# Patient Record
Sex: FEMALE | URBAN - METROPOLITAN AREA
[De-identification: names, ages, dates, MRNs, and addresses within clinical notes are randomized per-mention and may not be internally consistent; named-entity substitution may affect disease eponyms.]

---

## 2021-10-25 ENCOUNTER — INPATIENT (INPATIENT)
Facility: HOSPITAL | Age: 31
LOS: 2 days | Discharge: HOME | End: 2021-10-28
Attending: INTERNAL MEDICINE | Admitting: INTERNAL MEDICINE
Payer: MEDICAID

## 2021-10-25 VITALS
SYSTOLIC BLOOD PRESSURE: 124 MMHG | HEART RATE: 131 BPM | DIASTOLIC BLOOD PRESSURE: 83 MMHG | HEIGHT: 62 IN | TEMPERATURE: 99 F | WEIGHT: 164.91 LBS | RESPIRATION RATE: 19 BRPM | OXYGEN SATURATION: 96 %

## 2021-10-25 LAB
ALBUMIN SERPL ELPH-MCNC: 3.4 G/DL — SIGNIFICANT CHANGE UP (ref 3.3–5)
ALP SERPL-CCNC: 69 U/L — SIGNIFICANT CHANGE UP (ref 40–120)
ALT FLD-CCNC: 22 U/L — SIGNIFICANT CHANGE UP (ref 12–78)
AMPHET UR-MCNC: NEGATIVE — SIGNIFICANT CHANGE UP
AMYLASE P1 CFR SERPL: 60 U/L — SIGNIFICANT CHANGE UP (ref 25–115)
ANION GAP SERPL CALC-SCNC: 6 MMOL/L — SIGNIFICANT CHANGE UP (ref 5–17)
APAP SERPL-MCNC: < 2 UG/ML (ref 10–30)
APPEARANCE UR: ABNORMAL
APTT BLD: 30 SEC — SIGNIFICANT CHANGE UP (ref 27.5–35.5)
AST SERPL-CCNC: 27 U/L — SIGNIFICANT CHANGE UP (ref 15–37)
BACTERIA # UR AUTO: ABNORMAL
BARBITURATES UR SCN-MCNC: NEGATIVE — SIGNIFICANT CHANGE UP
BASOPHILS # BLD AUTO: 0.03 K/UL — SIGNIFICANT CHANGE UP (ref 0–0.2)
BASOPHILS NFR BLD AUTO: 0.2 % — SIGNIFICANT CHANGE UP (ref 0–2)
BENZODIAZ UR-MCNC: NEGATIVE — SIGNIFICANT CHANGE UP
BILIRUB DIRECT SERPL-MCNC: 0.07 MG/DL — SIGNIFICANT CHANGE UP (ref 0.05–0.2)
BILIRUB INDIRECT FLD-MCNC: 0.1 MG/DL — LOW (ref 0.2–1)
BILIRUB SERPL-MCNC: 0.2 MG/DL — SIGNIFICANT CHANGE UP (ref 0.2–1.2)
BILIRUB UR-MCNC: NEGATIVE — SIGNIFICANT CHANGE UP
BUN SERPL-MCNC: 5 MG/DL — LOW (ref 7–23)
CALCIUM SERPL-MCNC: 9.1 MG/DL — SIGNIFICANT CHANGE UP (ref 8.5–10.1)
CHLORIDE SERPL-SCNC: 109 MMOL/L — HIGH (ref 96–108)
CO2 SERPL-SCNC: 26 MMOL/L — SIGNIFICANT CHANGE UP (ref 22–31)
COCAINE METAB.OTHER UR-MCNC: NEGATIVE — SIGNIFICANT CHANGE UP
COLOR SPEC: ABNORMAL
CREAT SERPL-MCNC: 0.67 MG/DL — SIGNIFICANT CHANGE UP (ref 0.5–1.3)
D DIMER BLD IA.RAPID-MCNC: <150 NG/ML DDU — SIGNIFICANT CHANGE UP
DIFF PNL FLD: ABNORMAL
EOSINOPHIL # BLD AUTO: 0.29 K/UL — SIGNIFICANT CHANGE UP (ref 0–0.5)
EOSINOPHIL NFR BLD AUTO: 2.3 % — SIGNIFICANT CHANGE UP (ref 0–6)
EPI CELLS # UR: SIGNIFICANT CHANGE UP
ETHANOL SERPL-MCNC: <10 MG/DL — SIGNIFICANT CHANGE UP (ref 0–10)
GLUCOSE SERPL-MCNC: 92 MG/DL — SIGNIFICANT CHANGE UP (ref 70–99)
GLUCOSE UR QL: NEGATIVE MG/DL — SIGNIFICANT CHANGE UP
HCG UR QL: NEGATIVE — SIGNIFICANT CHANGE UP
HCT VFR BLD CALC: 32.5 % — LOW (ref 34.5–45)
HGB BLD-MCNC: 10.8 G/DL — LOW (ref 11.5–15.5)
IMM GRANULOCYTES NFR BLD AUTO: 0.4 % — SIGNIFICANT CHANGE UP (ref 0–1.5)
INR BLD: 1.01 RATIO — SIGNIFICANT CHANGE UP (ref 0.88–1.16)
KETONES UR-MCNC: NEGATIVE — SIGNIFICANT CHANGE UP
LEUKOCYTE ESTERASE UR-ACNC: ABNORMAL
LIDOCAIN IGE QN: 27 U/L — LOW (ref 73–393)
LYMPHOCYTES # BLD AUTO: 16.1 % — SIGNIFICANT CHANGE UP (ref 13–44)
LYMPHOCYTES # BLD AUTO: 2 K/UL — SIGNIFICANT CHANGE UP (ref 1–3.3)
MCHC RBC-ENTMCNC: 30.3 PG — SIGNIFICANT CHANGE UP (ref 27–34)
MCHC RBC-ENTMCNC: 33.2 GM/DL — SIGNIFICANT CHANGE UP (ref 32–36)
MCV RBC AUTO: 91.3 FL — SIGNIFICANT CHANGE UP (ref 80–100)
METHADONE UR-MCNC: NEGATIVE — SIGNIFICANT CHANGE UP
MONOCYTES # BLD AUTO: 0.59 K/UL — SIGNIFICANT CHANGE UP (ref 0–0.9)
MONOCYTES NFR BLD AUTO: 4.7 % — SIGNIFICANT CHANGE UP (ref 2–14)
NEUTROPHILS # BLD AUTO: 9.5 K/UL — HIGH (ref 1.8–7.4)
NEUTROPHILS NFR BLD AUTO: 76.3 % — SIGNIFICANT CHANGE UP (ref 43–77)
NITRITE UR-MCNC: NEGATIVE — SIGNIFICANT CHANGE UP
NRBC # BLD: 0 /100 WBCS — SIGNIFICANT CHANGE UP (ref 0–0)
OPIATES UR-MCNC: NEGATIVE — SIGNIFICANT CHANGE UP
PCP SPEC-MCNC: SIGNIFICANT CHANGE UP
PCP UR-MCNC: NEGATIVE — SIGNIFICANT CHANGE UP
PH UR: 6.5 — SIGNIFICANT CHANGE UP (ref 5–8)
PLATELET # BLD AUTO: 251 K/UL — SIGNIFICANT CHANGE UP (ref 150–400)
POTASSIUM SERPL-MCNC: 4.3 MMOL/L — SIGNIFICANT CHANGE UP (ref 3.5–5.3)
POTASSIUM SERPL-SCNC: 4.3 MMOL/L — SIGNIFICANT CHANGE UP (ref 3.5–5.3)
PROT SERPL-MCNC: 7.6 GM/DL — SIGNIFICANT CHANGE UP (ref 6–8.3)
PROT UR-MCNC: 100 MG/DL
PROTHROM AB SERPL-ACNC: 11.7 SEC — SIGNIFICANT CHANGE UP (ref 10.6–13.6)
RBC # BLD: 3.56 M/UL — LOW (ref 3.8–5.2)
RBC # FLD: 13.4 % — SIGNIFICANT CHANGE UP (ref 10.3–14.5)
RBC CASTS # UR COMP ASSIST: ABNORMAL /HPF (ref 0–4)
SALICYLATES SERPL-MCNC: <1.7 MG/DL — LOW (ref 2.8–20)
SODIUM SERPL-SCNC: 141 MMOL/L — SIGNIFICANT CHANGE UP (ref 135–145)
SP GR SPEC: 1 — LOW (ref 1.01–1.02)
THC UR QL: NEGATIVE — SIGNIFICANT CHANGE UP
UROBILINOGEN FLD QL: NEGATIVE MG/DL — SIGNIFICANT CHANGE UP
WBC # BLD: 12.46 K/UL — HIGH (ref 3.8–10.5)
WBC # FLD AUTO: 12.46 K/UL — HIGH (ref 3.8–10.5)
WBC UR QL: SIGNIFICANT CHANGE UP

## 2021-10-25 PROCEDURE — 70450 CT HEAD/BRAIN W/O DYE: CPT | Mod: 26,MA

## 2021-10-25 PROCEDURE — 71045 X-RAY EXAM CHEST 1 VIEW: CPT | Mod: 26

## 2021-10-25 PROCEDURE — 99285 EMERGENCY DEPT VISIT HI MDM: CPT

## 2021-10-25 RX ORDER — FAMOTIDINE 10 MG/ML
20 INJECTION INTRAVENOUS ONCE
Refills: 0 | Status: COMPLETED | OUTPATIENT
Start: 2021-10-25 | End: 2021-10-25

## 2021-10-25 RX ORDER — DIPHENHYDRAMINE HCL 50 MG
25 CAPSULE ORAL ONCE
Refills: 0 | Status: COMPLETED | OUTPATIENT
Start: 2021-10-25 | End: 2021-10-25

## 2021-10-25 RX ORDER — SODIUM CHLORIDE 9 MG/ML
1000 INJECTION INTRAMUSCULAR; INTRAVENOUS; SUBCUTANEOUS ONCE
Refills: 0 | Status: COMPLETED | OUTPATIENT
Start: 2021-10-25 | End: 2021-10-25

## 2021-10-25 RX ADMIN — FAMOTIDINE 20 MILLIGRAM(S): 10 INJECTION INTRAVENOUS at 22:55

## 2021-10-25 RX ADMIN — SODIUM CHLORIDE 1000 MILLILITER(S): 9 INJECTION INTRAMUSCULAR; INTRAVENOUS; SUBCUTANEOUS at 19:46

## 2021-10-25 RX ADMIN — Medication 25 MILLIGRAM(S): at 22:55

## 2021-10-25 RX ADMIN — Medication 125 MILLIGRAM(S): at 22:55

## 2021-10-25 RX ADMIN — SODIUM CHLORIDE 1000 MILLILITER(S): 9 INJECTION INTRAMUSCULAR; INTRAVENOUS; SUBCUTANEOUS at 22:46

## 2021-10-25 NOTE — ED PROVIDER NOTE - PHYSICAL EXAMINATION
General: Awake, alert and oriented. No acute distress. Well developed, hydrated and nourished. Appears stated age.   Skin: Skin in warm, dry and intact without rashes or lesions. Appropriate color for ethnicity  HENMT: head normocephalic and atraumatic; bilateral external ears without swelling. no nasal discharge. moist oral mucosa. supple neck, trachea midline  EYES: Conjunctiva clear. nonicteric sclera. EOM intact, Eyelids are normal in appearance without swelling or lesions. pupils mildly constricted but reactive  Cardiac: well perfused, s1,s2, rrr  Respiratory: breathing comfortably on room air. no audible wheezing or stridor  Abdominal: nondistended  MSK: Neck and back are without deformity, visible external skin changes, or signs of trauma. Curvature of the cervical, thoracic, and lumbar spine are within normal limits. no external signs of trauma. no apparent deficits in ROM of any extremity  Neurological: The patient is awake, alert and oriented to person, place, and time with normal speech. CN 2-12 grossly intact. no apparent deficits. Memory is normal and thought process is intact. No gait abnormalities are appreciated.   Psychiatric: Appropriate mood and affect. Good judgement and insight. No visual or auditory hallucinations.

## 2021-10-25 NOTE — ED PROVIDER NOTE - CLINICAL SUMMARY MEDICAL DECISION MAKING FREE TEXT BOX
Patient now feels well, awake and alert. Mildly tachycardiac. However, feels anxiety and sadness due to her relapse. Patient is requesting U-tox so she can get back into her outpatient program. Will test U-tox, observe in ED and likely DC.

## 2021-10-25 NOTE — ED ADULT NURSE NOTE - HOW OFTEN DO YOU HAVE A DRINK CONTAINING ALCOHOL?
How Severe Is Your Acne?: mild Is This A New Presentation, Or A Follow-Up?: Acne Females Only: When Was Your Last Menstrual Period?: 01/19/20 Monthly or less Never

## 2021-10-25 NOTE — ED ADULT TRIAGE NOTE - CHIEF COMPLAINT QUOTE
BIBA- Found on street with shallow respirations. Bystander called 911.   Admits to Heroin use. Also took Gabapentin and Seroquel  HX substance abuse clean 9months, On Ambien, Seroquel and Gabapentin

## 2021-10-25 NOTE — ED PROVIDER NOTE - NSCAREINITIATED _GEN_ER
Spoke with [facility]ist for [facility] 2.5 mg and 7.5 mg tabs authorized for 90 day term.  Patient currently takes 2.5 mg on Monday and 3.75 mg (1/2 tab of 7.5 mg) 6 days per week.   
Jessica Wayne(Attending)

## 2021-10-25 NOTE — ED PROVIDER NOTE - OBJECTIVE STATEMENT
30 y/o F with a PMHx of Substance Abuse was BIBA to the ED for evaluation. Today, after receiving upsetting news Patient relapsed and used intranasal heroin and passed out. Patient is managed on Ambien and Seroquel as prescribed by her psychiatrist. Denies SI, HI, recent illness, co-ingestion or using more than her prescribed dose of home medications. Currently in the ED, Patient now feels fine. Unknown if Patient received Narcan by EMS or not. 32 y/o F with a PMHx of seizure disorder on neurontin, Substance Abuse was BIBA to the ED for evaluation. Today, after receiving upsetting news Patient relapsed and used what she thinks was intranasal heroin and passed out. Patient is managed on Ambien and Seroquel, ehich she started over the past week, as prescribed by her physician. Denies SI, HI, recent illness, co-ingestion or using more than her prescribed dose of home medications. Currently in the ED, Patient now feels fine. EMS flow sheet does not mention patient receiving narcan

## 2021-10-25 NOTE — ED PROVIDER NOTE - CARE PLAN
Principal Discharge DX:	Intractable back pain   1 Principal Discharge DX:	Rash of unknown etiology  Secondary Diagnosis:	Intractable back pain

## 2021-10-25 NOTE — ED ADULT NURSE NOTE - OBJECTIVE STATEMENT
pt presented to ER, HAIR, with complaints of overdose. as per patient, she is unaware why she was brought in to ER. pt states that she was told she was found unresponsive. pt alert, oriented and ambulatory. pt denies any alcohol use, states she has a hx of heroin use and had "one lapse in 9 months". pt upset, irritable and crying on assessment. pt explains that she is worried about her placement in the shelter and does not want to go back to a detox facility.

## 2021-10-26 LAB
CRP SERPL-MCNC: 32 MG/L — HIGH
ERYTHROCYTE [SEDIMENTATION RATE] IN BLOOD: 28 MM/HR — HIGH (ref 0–15)
FLUAV AG NPH QL: SIGNIFICANT CHANGE UP
FLUBV AG NPH QL: SIGNIFICANT CHANGE UP
HCT VFR BLD CALC: 33.6 % — LOW (ref 34.5–45)
HGB BLD-MCNC: 10.8 G/DL — LOW (ref 11.5–15.5)
LACTATE SERPL-SCNC: 1.6 MMOL/L — SIGNIFICANT CHANGE UP (ref 0.7–2)
LACTATE SERPL-SCNC: 3.4 MMOL/L — HIGH (ref 0.7–2)
LACTATE SERPL-SCNC: 4 MMOL/L — CRITICAL HIGH (ref 0.7–2)
MCHC RBC-ENTMCNC: 29.8 PG — SIGNIFICANT CHANGE UP (ref 27–34)
MCHC RBC-ENTMCNC: 32.1 GM/DL — SIGNIFICANT CHANGE UP (ref 32–36)
MCV RBC AUTO: 92.8 FL — SIGNIFICANT CHANGE UP (ref 80–100)
NRBC # BLD: 0 /100 WBCS — SIGNIFICANT CHANGE UP (ref 0–0)
PLATELET # BLD AUTO: 255 K/UL — SIGNIFICANT CHANGE UP (ref 150–400)
RBC # BLD: 3.62 M/UL — LOW (ref 3.8–5.2)
RBC # FLD: 13.4 % — SIGNIFICANT CHANGE UP (ref 10.3–14.5)
SARS-COV-2 RNA SPEC QL NAA+PROBE: SIGNIFICANT CHANGE UP
WBC # BLD: 11.98 K/UL — HIGH (ref 3.8–10.5)
WBC # FLD AUTO: 11.98 K/UL — HIGH (ref 3.8–10.5)

## 2021-10-26 PROCEDURE — 12345: CPT | Mod: NC

## 2021-10-26 PROCEDURE — 99254 IP/OBS CNSLTJ NEW/EST MOD 60: CPT

## 2021-10-26 PROCEDURE — 99222 1ST HOSP IP/OBS MODERATE 55: CPT

## 2021-10-26 RX ORDER — LIDOCAINE 4 G/100G
1 CREAM TOPICAL ONCE
Refills: 0 | Status: COMPLETED | OUTPATIENT
Start: 2021-10-26 | End: 2021-10-26

## 2021-10-26 RX ORDER — KETOROLAC TROMETHAMINE 30 MG/ML
15 SYRINGE (ML) INJECTION ONCE
Refills: 0 | Status: DISCONTINUED | OUTPATIENT
Start: 2021-10-26 | End: 2021-10-26

## 2021-10-26 RX ORDER — ACETAMINOPHEN 500 MG
650 TABLET ORAL ONCE
Refills: 0 | Status: COMPLETED | OUTPATIENT
Start: 2021-10-26 | End: 2021-10-26

## 2021-10-26 RX ORDER — ZOLPIDEM TARTRATE 10 MG/1
5 TABLET ORAL AT BEDTIME
Refills: 0 | Status: DISCONTINUED | OUTPATIENT
Start: 2021-10-26 | End: 2021-10-28

## 2021-10-26 RX ORDER — QUETIAPINE FUMARATE 200 MG/1
100 TABLET, FILM COATED ORAL
Refills: 0 | Status: DISCONTINUED | OUTPATIENT
Start: 2021-10-26 | End: 2021-10-28

## 2021-10-26 RX ORDER — INFLUENZA VIRUS VACCINE 15; 15; 15; 15 UG/.5ML; UG/.5ML; UG/.5ML; UG/.5ML
0.5 SUSPENSION INTRAMUSCULAR ONCE
Refills: 0 | Status: DISCONTINUED | OUTPATIENT
Start: 2021-10-26 | End: 2021-10-28

## 2021-10-26 RX ORDER — ACETAMINOPHEN 500 MG
650 TABLET ORAL EVERY 6 HOURS
Refills: 0 | Status: DISCONTINUED | OUTPATIENT
Start: 2021-10-26 | End: 2021-10-28

## 2021-10-26 RX ORDER — SODIUM CHLORIDE 9 MG/ML
1000 INJECTION, SOLUTION INTRAVENOUS ONCE
Refills: 0 | Status: COMPLETED | OUTPATIENT
Start: 2021-10-26 | End: 2021-10-26

## 2021-10-26 RX ORDER — SODIUM CHLORIDE 9 MG/ML
1000 INJECTION, SOLUTION INTRAVENOUS
Refills: 0 | Status: DISCONTINUED | OUTPATIENT
Start: 2021-10-26 | End: 2021-10-28

## 2021-10-26 RX ORDER — QUETIAPINE FUMARATE 200 MG/1
300 TABLET, FILM COATED ORAL
Refills: 0 | Status: DISCONTINUED | OUTPATIENT
Start: 2021-10-26 | End: 2021-10-28

## 2021-10-26 RX ORDER — SODIUM CHLORIDE 9 MG/ML
1000 INJECTION INTRAMUSCULAR; INTRAVENOUS; SUBCUTANEOUS ONCE
Refills: 0 | Status: DISCONTINUED | OUTPATIENT
Start: 2021-10-26 | End: 2021-10-26

## 2021-10-26 RX ORDER — GABAPENTIN 400 MG/1
800 CAPSULE ORAL ONCE
Refills: 0 | Status: COMPLETED | OUTPATIENT
Start: 2021-10-26 | End: 2021-10-26

## 2021-10-26 RX ORDER — GABAPENTIN 400 MG/1
800 CAPSULE ORAL
Refills: 0 | Status: DISCONTINUED | OUTPATIENT
Start: 2021-10-26 | End: 2021-10-27

## 2021-10-26 RX ADMIN — LIDOCAINE 1 PATCH: 4 CREAM TOPICAL at 12:31

## 2021-10-26 RX ADMIN — Medication 15 MILLIGRAM(S): at 18:12

## 2021-10-26 RX ADMIN — Medication 15 MILLIGRAM(S): at 10:32

## 2021-10-26 RX ADMIN — GABAPENTIN 800 MILLIGRAM(S): 400 CAPSULE ORAL at 14:53

## 2021-10-26 RX ADMIN — QUETIAPINE FUMARATE 100 MILLIGRAM(S): 200 TABLET, FILM COATED ORAL at 06:43

## 2021-10-26 RX ADMIN — SODIUM CHLORIDE 1000 MILLILITER(S): 9 INJECTION, SOLUTION INTRAVENOUS at 13:27

## 2021-10-26 RX ADMIN — SODIUM CHLORIDE 75 MILLILITER(S): 9 INJECTION, SOLUTION INTRAVENOUS at 04:49

## 2021-10-26 RX ADMIN — GABAPENTIN 800 MILLIGRAM(S): 400 CAPSULE ORAL at 17:11

## 2021-10-26 RX ADMIN — Medication 15 MILLIGRAM(S): at 03:05

## 2021-10-26 RX ADMIN — GABAPENTIN 800 MILLIGRAM(S): 400 CAPSULE ORAL at 02:09

## 2021-10-26 RX ADMIN — LIDOCAINE 1 PATCH: 4 CREAM TOPICAL at 01:05

## 2021-10-26 RX ADMIN — Medication 15 MILLIGRAM(S): at 02:51

## 2021-10-26 RX ADMIN — Medication 15 MILLIGRAM(S): at 11:15

## 2021-10-26 RX ADMIN — Medication 1 MILLIGRAM(S): at 02:09

## 2021-10-26 RX ADMIN — Medication 15 MILLIGRAM(S): at 01:10

## 2021-10-26 RX ADMIN — Medication 15 MILLIGRAM(S): at 01:05

## 2021-10-26 RX ADMIN — Medication 15 MILLIGRAM(S): at 17:47

## 2021-10-26 NOTE — PROGRESS NOTE ADULT - ASSESSMENT
32yo F PMH Seizure Disorder, drug abuse, presenting with LOC after snorting heroin, found to be tremulous.    # ? Seizure - Neuro input appreciated.  EEG.  Continue Gabapentin,   # Drug Abuse - pt reports snorting heroin.  Counselled on cessation.  Supportive care.  # DVT Prophylaxis - OOB  32yo F PMH Seizure Disorder, drug abuse, presenting with LOC after snorting heroin, found to be tremulous.    # ? Seizure - Neuro input appreciated.  EEG.  Continue Gabapentin,   # Drug Abuse - pt reports snorting heroin.  Counselled on cessation.  Supportive care.  # Elevated lactate - unclear etiology.  ? due to seizure, unspecified substance use.  Monitor lactate, continue IVF.    # DVT Prophylaxis - OOB

## 2021-10-26 NOTE — H&P ADULT - ASSESSMENT
32 y/o female PMH of seizure disorder on Neurontin, Substance Abuse was BIBA to the ED for evaluation. Today, after receiving upsetting news patient relapsed and used what she thinks was intranasal heroin and passed out.   Patient is managed on Ambien and Seroquel, which she started over the past week, as prescribed by her physician. Denies SI, HI, recent illness, co-ingestion or using more than her prescribed dose of home medications. Currently in the ED, Patient now feels fine. EMS flow sheet does not mention patient receiving narcan by EMS.     Plan: Admit to medicine, IVF for mild dehydration with elevated lactate 3.4, repeat later today. CT head no acute findings. Neuro exam is non-focal. Speech   fluent. Urine tox negative, labs unremarkable. ER wanted neuro eval for tremors.   32 y/o female PMH of seizure disorder on Neurontin, Substance Abuse was BIBA to the ED for evaluation. Today, after receiving upsetting news patient relapsed and used what she thinks was intranasal heroin and passed out.   Patient is managed on Ambien and Seroquel, which she started over the past week, as prescribed by her physician. Denies SI, HI, recent illness, co-ingestion or using more than her prescribed dose of home medications. Currently in the ED, Patient now feels fine. EMS flow sheet does not mention patient receiving narcan by EMS.     Plan: Admit to medicine, IVF for mild dehydration with elevated lactate 3.4, repeat later today. CT head no acute findings. Neuro exam is non-focal. Speech   fluent. Urine tox negative, labs unremarkable. ER wanted neuro eval for tremors, can be called in AM by day team.    30 y/o female PMH of seizure disorder on Neurontin, Substance Abuse was BIBA to the ED for evaluation. Today, after receiving upsetting news patient relapsed and used what she thinks was intranasal heroin and passed out.   Patient is managed on Ambien and Seroquel, which she started over the past week, as prescribed by her physician. Denies SI, HI, recent illness, co-ingestion or using more than her prescribed dose of home medications. Currently in the ED, Patient now feels fine. EMS flow sheet does not mention patient receiving narcan by EMS.     Plan: Admit to medicine, IVF for mild dehydration with elevated lactate 3.4, repeat later today. CT head no acute findings. Neuro exam is non-focal. Speech   fluent. Urine tox negative, labs unremarkable.     ER wanted neuro eval for tremors. Dr. Perry from Neurology was called and will see patient this AM.

## 2021-10-26 NOTE — H&P ADULT - NSHPLABSRESULTS_GEN_ALL_CORE
LABS:                        10.8   11.98 )-----------( 255      ( 26 Oct 2021 04:17 )             33.6     10    141  |  109<H>  |  5<L>  ----------------------------<  92  4.3   |  26  |  0.67    Ca    9.1      25 Oct 2021 18:25    TPro  7.6  /  Alb  3.4  /  TBili  0.2  /  DBili  .07  /  AST  27  /  ALT  22  /  AlkPhos  69  10    PT/INR - ( 25 Oct 2021 18:25 )   PT: 11.7 sec;   INR: 1.01 ratio         PTT - ( 25 Oct 2021 18:25 )  PTT:30.0 sec  Urinalysis Basic - ( 25 Oct 2021 19:37 )    Color: Red / Appearance: bloody / S.005 / pH: x  Gluc: x / Ketone: Negative  / Bili: Negative / Urobili: Negative mg/dL   Blood: x / Protein: 100 mg/dL / Nitrite: Negative   Leuk Esterase: Small / RBC: 3-5 /HPF / WBC 0-2   Sq Epi: x / Non Sq Epi: Few / Bacteria: Moderate          RADIOLOGY & ADDITIONAL TESTS:

## 2021-10-26 NOTE — CONSULT NOTE ADULT - SUBJECTIVE AND OBJECTIVE BOX
NEUROLOGY CONSULT    HPI:  30 yo woman admitted after losing consciousness on the street after taking intranasal heroin, reportedly.  She remembers blacking out on the street and waking up with people around her.  Ambulance was called and she was brought to ER.  While in ER, patient felt very shaky and tremulous.  She reports having a history of convulsive seizures since the age of 16 which occur sporadically, a few times per year, and for which she takes Gabapentin 800mg PO BID, although she says she does not see a neurologist regularly.  No epilepsy risk factors.    Head CT: normal    Labs:   ESR = 28  CRP = 32  WBC = 11.98  Lactate = 3.4  Utox - negative    NEUROLOGICAL EXAM:  T 98.4 F  /74  HR 88  MS: Awake, alert, oriented x 4, language fluent, comprehension intact, naming intact, repetition intact, normal affect  CN: PERRLA, EOMI, visual fields intact, facial sensation intact, face symmetric, hearing intact, no dysarthria, symmetric palatal elevation, tongue midline, symmetric shoulder shrug and SCM strength  Motor: normal bulk, normal tone, power 5/5 in all four extremities, +postural hand tremors, no fasciculations  Sensation: intact to light touch, vibration sense, proprioception  Reflexes: 2 at biceps, brachioradialis, patella, ankle bilaterally.  Flexor plantar response bilaterally.  No clonus  Coordination: no dysmetria    NIHSS = 0    Assessment:  30 yo woman with episode of loss of consciousness after using heroin.  Unclear whether this was a syncopal event or a seizure, but was likely provoked by use of heroin.    Recommendations:  1. Continue Gabapentin 800mg PO BID  2. routine EEG  3. Seizure precautions    Christoph Perry MD  [facility] Department of Neurology  Epilepsy Center

## 2021-10-26 NOTE — H&P ADULT - HISTORY OF PRESENT ILLNESS
30 y/o female PMH of seizure disorder on Neurontin, Substance Abuse was BIBA to the ED for evaluation. Today, after receiving upsetting news patient relapsed and used what she thinks was intranasal heroin and passed out.   Patient is managed on Ambien and Seroquel, which she started over the past week, as prescribed by her physician. Denies SI, HI, recent illness, co-ingestion or using more than her prescribed dose of home medications. Currently in the ED, Patient now feels fine. EMS flow sheet does not mention patient receiving narcan by EMS.

## 2021-10-26 NOTE — PATIENT PROFILE ADULT - FALL HARM RISK TYPE OF ASSESSMENT
Quality 130: Documentation Of Current Medications In The Medical Record: Current Medications Documented
Detail Level: Detailed
Quality 431: Preventive Care And Screening: Unhealthy Alcohol Use - Screening: Patient screened for unhealthy alcohol use using a single question and scores less than 2 times per year
Quality 47: Advance Care Plan: Advance Care Planning discussed and documented; advance care plan or surrogate decision maker documented in the medical record.
admission

## 2021-10-26 NOTE — H&P ADULT - NSHPPHYSICALEXAM_GEN_ALL_CORE
PHYSICAL EXAMINATION:  Vital Signs Last 24 Hrs  T(C): 37 (25 Oct 2021 23:31), Max: 37.2 (25 Oct 2021 16:53)  T(F): 98.6 (25 Oct 2021 23:31), Max: 99 (25 Oct 2021 16:53)  HR: 98 (25 Oct 2021 23:31) (98 - 131)  BP: 130/75 (25 Oct 2021 23:31) (122/76 - 130/75)  BP(mean): --  RR: 18 (25 Oct 2021 23:31) (10 - 19)  SpO2: 99% (25 Oct 2021 23:31) (94% - 99%)  CAPILLARY BLOOD GLUCOSE          GENERAL: NAD, well-groomed, well-developed  HEAD:  atraumatic, normocephalic  EYES: sclera anicteric  ENMT: mucous membranes moist  NECK: supple, No JVD  CHEST/LUNG: clear to auscultation bilaterally; no rales, rhonchi, or wheezing b/l  HEART: normal S1, S2  ABDOMEN: BS+, soft, ND, NT   EXTREMITIES:  pulses palpable; no clubbing, cyanosis, or edema b/l LEs  NEURO: awake, alert, interactive; moves all extremities  SKIN: no rashes or lesions PHYSICAL EXAMINATION:  Vital Signs Last 24 Hrs  T(C): 37 (25 Oct 2021 23:31), Max: 37.2 (25 Oct 2021 16:53)  T(F): 98.6 (25 Oct 2021 23:31), Max: 99 (25 Oct 2021 16:53)  HR: 98 (25 Oct 2021 23:31) (98 - 131)  BP: 130/75 (25 Oct 2021 23:31) (122/76 - 130/75)  BP(mean): --  RR: 18 (25 Oct 2021 23:31) (10 - 19)  SpO2: 99% (25 Oct 2021 23:31) (94% - 99%)  CAPILLARY BLOOD GLUCOSE          GENERAL: NAD, seen in ER, comfortable, alert, answers all questions, aware of home meds.   HEAD:  atraumatic, normocephalic  EYES: sclera anicteric  ENMT: mucous membranes moist  NECK: supple, No JVD  CHEST/LUNG: clear to auscultation bilaterally; no rales, rhonchi, or wheezing b/l  HEART: normal S1, S2  ABDOMEN: BS+, soft, ND, NT   EXTREMITIES:  pulses palpable; no clubbing, cyanosis, or edema b/l LEs  NEURO: awake, alert, interactive; moves all extremities  SKIN: no rashes or lesions

## 2021-10-27 LAB
ANION GAP SERPL CALC-SCNC: 4 MMOL/L — LOW (ref 5–17)
BUN SERPL-MCNC: 12 MG/DL — SIGNIFICANT CHANGE UP (ref 7–23)
CALCIUM SERPL-MCNC: 8.5 MG/DL — SIGNIFICANT CHANGE UP (ref 8.5–10.1)
CHLORIDE SERPL-SCNC: 112 MMOL/L — HIGH (ref 96–108)
CO2 SERPL-SCNC: 28 MMOL/L — SIGNIFICANT CHANGE UP (ref 22–31)
COVID-19 SPIKE DOMAIN AB INTERP: POSITIVE
COVID-19 SPIKE DOMAIN ANTIBODY RESULT: 52.2 U/ML — HIGH
CREAT SERPL-MCNC: 0.71 MG/DL — SIGNIFICANT CHANGE UP (ref 0.5–1.3)
GLUCOSE SERPL-MCNC: 115 MG/DL — HIGH (ref 70–99)
HCT VFR BLD CALC: 30.4 % — LOW (ref 34.5–45)
HGB BLD-MCNC: 10 G/DL — LOW (ref 11.5–15.5)
MCHC RBC-ENTMCNC: 29.9 PG — SIGNIFICANT CHANGE UP (ref 27–34)
MCHC RBC-ENTMCNC: 32.9 GM/DL — SIGNIFICANT CHANGE UP (ref 32–36)
MCV RBC AUTO: 91 FL — SIGNIFICANT CHANGE UP (ref 80–100)
NRBC # BLD: 0 /100 WBCS — SIGNIFICANT CHANGE UP (ref 0–0)
PLATELET # BLD AUTO: 235 K/UL — SIGNIFICANT CHANGE UP (ref 150–400)
POTASSIUM SERPL-MCNC: 3.7 MMOL/L — SIGNIFICANT CHANGE UP (ref 3.5–5.3)
POTASSIUM SERPL-SCNC: 3.7 MMOL/L — SIGNIFICANT CHANGE UP (ref 3.5–5.3)
RBC # BLD: 3.34 M/UL — LOW (ref 3.8–5.2)
RBC # FLD: 13.4 % — SIGNIFICANT CHANGE UP (ref 10.3–14.5)
SARS-COV-2 IGG+IGM SERPL QL IA: 52.2 U/ML — HIGH
SARS-COV-2 IGG+IGM SERPL QL IA: POSITIVE
SODIUM SERPL-SCNC: 144 MMOL/L — SIGNIFICANT CHANGE UP (ref 135–145)
WBC # BLD: 11.72 K/UL — HIGH (ref 3.8–10.5)
WBC # FLD AUTO: 11.72 K/UL — HIGH (ref 3.8–10.5)

## 2021-10-27 PROCEDURE — 99232 SBSQ HOSP IP/OBS MODERATE 35: CPT

## 2021-10-27 PROCEDURE — 71101 X-RAY EXAM UNILAT RIBS/CHEST: CPT | Mod: 26,LT

## 2021-10-27 RX ORDER — GABAPENTIN 400 MG/1
800 CAPSULE ORAL
Refills: 0 | Status: DISCONTINUED | OUTPATIENT
Start: 2021-10-27 | End: 2021-10-28

## 2021-10-27 RX ORDER — NYSTATIN 500MM UNIT
500000 POWDER (EA) MISCELLANEOUS
Refills: 0 | Status: DISCONTINUED | OUTPATIENT
Start: 2021-10-27 | End: 2021-10-28

## 2021-10-27 RX ADMIN — SODIUM CHLORIDE 75 MILLILITER(S): 9 INJECTION, SOLUTION INTRAVENOUS at 03:01

## 2021-10-27 RX ADMIN — GABAPENTIN 800 MILLIGRAM(S): 400 CAPSULE ORAL at 17:03

## 2021-10-27 RX ADMIN — QUETIAPINE FUMARATE 300 MILLIGRAM(S): 200 TABLET, FILM COATED ORAL at 00:01

## 2021-10-27 RX ADMIN — QUETIAPINE FUMARATE 300 MILLIGRAM(S): 200 TABLET, FILM COATED ORAL at 20:38

## 2021-10-27 RX ADMIN — ZOLPIDEM TARTRATE 5 MILLIGRAM(S): 10 TABLET ORAL at 00:00

## 2021-10-27 RX ADMIN — Medication 500000 UNIT(S): at 17:03

## 2021-10-27 RX ADMIN — GABAPENTIN 800 MILLIGRAM(S): 400 CAPSULE ORAL at 07:50

## 2021-10-27 RX ADMIN — QUETIAPINE FUMARATE 100 MILLIGRAM(S): 200 TABLET, FILM COATED ORAL at 08:38

## 2021-10-27 NOTE — EEG REPORT - NS EEG TEXT BOX
[facility]   COMPREHENSIVE EPILEPSY CENTER   REPORT OF ROUTINE VIDEO EEG     [facility]: [address], [place] [address], Ph#: [phone]  [facility]: [id number], [place] [address], Ph#: [phone]  [facility]: [address] [place] [address], Ph#: [phone]    Patient Name: KEYSHAWN RIVAS  Age and : 31y ([birth year])  MRN #: [id number]  Location: [facility] 1B [address]  Referring Physician: Martinez De Santiago    Study Date: 10-27-21  _____________________________________________________________  TECHNICAL INFORMATION    Placement and Labeling of Electrodes:  The EEG was performed utilizing 20 channels referential EEG connections (coronal over temporal over parasagittal montage) using all standard 10-20 electrode placements with EKG.  Recording was at a sampling rate of 256 samples per second per channel.  Time synchronized digital video recording was done simultaneously with EEG recording.  A low light infrared camera was used for low light recording.  Stas and seizure detection algorithms were utilized.  _____________________________________________________________  HISTORY    Patient is a 31y old  Female who presents with a chief complaint of Syncope eval. (26 Oct 2021 16:04)    PERTINENT MEDICATION:  gabapentin 800 milliGRAM(s) Oral two times a day  _____________________________________________________________  STUDY INTERPRETATION    Findings: The background was continuous, spontaneously variable and reactive. During wakefulness, the posterior dominant rhythm consisted of symmetric, well-modulated 8 Hz activity, with amplitude to 30 uV, that attenuated to eye opening.     Background Slowing:  none    Focal Slowing:   None were present.    Sleep Background:  Drowsiness was characterized by fragmentation, attenuation, and slowing of the background activity.    Sleep was characterized by the presence of vertex waves, symmetric sleep spindles and K-complexes.    Other Non-Epileptiform Findings:  None were present.    Interictal Epileptiform Activity:   None were present.    Events:  Clinical events: None recorded.  Seizures: None recorded.    Activation Procedures:   Hyperventilation was not performed.    Photic stimulation was performed and did not elicit any abnormality.     Artifacts:  Intermittent myogenic and movement artifacts were noted.    ECG:  The heart rate on single channel ECG was predominantly between  BPM.    _____________________________________________________________  EEG SUMMARY/CLASSIFICATION    Normal EEG in the awake, drowsy and asleep states.  _____________________________________________________________  EEG IMPRESSION/CLINICAL CORRELATE    Normal EEG study.  No epileptiform pattern or seizure seen.    Saul Amezcua MD PGY-5  Epilepsy Fellow    This Preliminary report is based on fellow review. Final report pending attending review.    Reading Room: [phone]  On Call Service After Hours: [phone] [facility]   COMPREHENSIVE EPILEPSY CENTER   REPORT OF ROUTINE VIDEO EEG     [facility]: [address], [place] [address], Ph#: [phone]  [facility]: [id number], [place] [address], Ph#: [phone]  [facility]: [address] [place] [address], Ph#: [phone]    Patient Name: KEYSHAWN RIVAS  Age and : 31y ([birth year])  MRN #: [id number]  Location: [facility] 1B [address]  Referring Physician: Martinez De Santiago    Study Date: 10-27-21  _____________________________________________________________  TECHNICAL INFORMATION    Placement and Labeling of Electrodes:  The EEG was performed utilizing 20 channels referential EEG connections (coronal over temporal over parasagittal montage) using all standard 10-20 electrode placements with EKG.  Recording was at a sampling rate of 256 samples per second per channel.  Time synchronized digital video recording was done simultaneously with EEG recording.  A low light infrared camera was used for low light recording.  Stas and seizure detection algorithms were utilized.  _____________________________________________________________  HISTORY    Patient is a 31y old  Female who presents with a chief complaint of Syncope eval. (26 Oct 2021 16:04)    PERTINENT MEDICATION:  gabapentin 800 milliGRAM(s) Oral two times a day  _____________________________________________________________  STUDY INTERPRETATION    Findings: The background was continuous, spontaneously variable and reactive. During wakefulness, the posterior dominant rhythm consisted of symmetric, well-modulated 8 Hz activity, with amplitude to 30 uV, that attenuated to eye opening.     Background Slowing:  none    Focal Slowing:   None were present.    Sleep Background:  Drowsiness was characterized by fragmentation, attenuation, and slowing of the background activity.    Sleep was characterized by the presence of vertex waves, symmetric sleep spindles and K-complexes.    Other Non-Epileptiform Findings:  None were present.    Interictal Epileptiform Activity:   None were present.    Events:  Clinical events: None recorded.  Seizures: None recorded.    Activation Procedures:   Hyperventilation was not performed.    Photic stimulation was performed and did not elicit any abnormality.     Artifacts:  Intermittent myogenic and movement artifacts were noted.    ECG:  The heart rate on single channel ECG was predominantly between  BPM.    _____________________________________________________________  EEG SUMMARY/CLASSIFICATION    Normal EEG in the awake, drowsy and asleep states.  _____________________________________________________________  EEG IMPRESSION/CLINICAL CORRELATE    Normal EEG study.  No epileptiform pattern or seizure seen.    Saul Amezcua MD PGY-5  Epilepsy Fellow    Reji Gabriel MD PhD  Director, Epilepsy Division, [facility] EEG Reading Room Ph#: [phone]  Epilepsy Answering Service after 5PM and before 8:30AM: Ph#: [phone]

## 2021-10-27 NOTE — PROGRESS NOTE ADULT - ASSESSMENT
32yo F PMH Seizure Disorder, drug abuse, presenting with LOC after snorting heroin, found to be tremulous.    # Suspected Seizure - Neuro input appreciated.  EEG.  Continue Gabapentin.  Pt has been tremulous.  Continue at BID dosing for now.  # L rib pain - s/p fall.  Will check CPK.  L rib series.   # Oral candidiasis - trial of nystatin S&S.   # Drug Abuse - pt reports snorting heroin.  Counselled on cessation.  Supportive care.  # Elevated lactate - resolved.    # DVT Prophylaxis - OOB

## 2021-10-28 ENCOUNTER — TRANSCRIPTION ENCOUNTER (OUTPATIENT)
Age: 31
End: 2021-10-28

## 2021-10-28 VITALS
OXYGEN SATURATION: 98 % | TEMPERATURE: 98 F | RESPIRATION RATE: 17 BRPM | HEART RATE: 98 BPM | SYSTOLIC BLOOD PRESSURE: 115 MMHG | DIASTOLIC BLOOD PRESSURE: 78 MMHG

## 2021-10-28 LAB — CK SERPL-CCNC: 38 U/L — SIGNIFICANT CHANGE UP (ref 26–192)

## 2021-10-28 PROCEDURE — 99239 HOSP IP/OBS DSCHRG MGMT >30: CPT

## 2021-10-28 RX ORDER — QUETIAPINE FUMARATE 200 MG/1
1 TABLET, FILM COATED ORAL
Qty: 0 | Refills: 0 | DISCHARGE
Start: 2021-10-28

## 2021-10-28 RX ORDER — NYSTATIN 500MM UNIT
5 POWDER (EA) MISCELLANEOUS
Qty: 100 | Refills: 0
Start: 2021-10-28 | End: 2021-11-01

## 2021-10-28 RX ORDER — GABAPENTIN 400 MG/1
2 CAPSULE ORAL
Qty: 0 | Refills: 0 | DISCHARGE
Start: 2021-10-28

## 2021-10-28 RX ADMIN — Medication 500000 UNIT(S): at 14:11

## 2021-10-28 RX ADMIN — GABAPENTIN 800 MILLIGRAM(S): 400 CAPSULE ORAL at 05:21

## 2021-10-28 RX ADMIN — Medication 500000 UNIT(S): at 09:21

## 2021-10-28 RX ADMIN — QUETIAPINE FUMARATE 100 MILLIGRAM(S): 200 TABLET, FILM COATED ORAL at 09:21

## 2021-10-28 NOTE — PROGRESS NOTE ADULT - ASSESSMENT
30yo F PMH Seizure Disorder, drug abuse, presenting with LOC after snorting heroin, found to be tremulous.    # Suspected Seizure - Neuro input d/w Dr. Perry.  EEG unremarkable.  Continue Gabapentin at BID dosing, reviewed with pt and neurology.  # L rib pain - s/p fall.  NO fracture on rib series.  CPK wnl.  Supportive care.   # Oral candidiasis - Responding to nystatin S&S.   # Drug Abuse - pt reports snorting heroin.  Counselled on cessation.  Supportive care.  # Elevated lactate - resolved.    # DVT Prophylaxis - OOB

## 2021-10-28 NOTE — DISCHARGE NOTE PROVIDER - NSDCFUADDINST_GEN_ALL_CORE_FT
It is important to see your primary physician as well as the physicians noted below within the next week to perform a comprehensive medical review.  Call their offices for an appointment as soon as you leave the hospital.  You will also need to see them for renewal of your medications.  If you do not have a primary physician, contact the [facility] Physician Referral Service at [phone].  To obtain your results, you can access the [email or web address] Patient Portal at [email or web address].  Your medical issues appear to be stable at this time, but if your symptoms recur or worsen, contact your physicians and/or return to the hospital if necessary.  If you encounter any issues or questions with your medication, call your physicians before stopping the medication.  Do not drive.  Limit your diet to 2 grams of sodium daily.

## 2021-10-28 NOTE — DISCHARGE NOTE PROVIDER - HOSPITAL COURSE
30yo F PMH Seizure Disorder, drug abuse, presenting with LOC after snorting heroin, found to be tremulous.    # Suspected Seizure - Neuro input d/w Dr. Perry.  EEG unremarkable.  Continue Gabapentin at BID dosing, reviewed with pt and neurology.  SHe is aware not to drive / operate heavy machinery / etc.   # L rib pain - s/p fall.  NO fracture on rib series.  CPK wnl.  Supportive care.   # Oral candidiasis - Responding to nystatin S&S.   # Drug Abuse - pt reports snorting heroin.  Counselled on cessation.  Supportive care.  # Elevated lactate - resolved.    # DVT Prophylaxis - OOB     Disposition: Stable for discharge.  Outpatient followup discussed.  Total time spent on discharge is  35  minutes.

## 2021-10-28 NOTE — PROGRESS NOTE ADULT - SUBJECTIVE AND OBJECTIVE BOX
Patient: KEYSHAWN RIVAS [id number] 31y Female                            Hospitalist Attending Note    c/o feeling tremulous.  Denies weakness / numbness.  Also c/o mild tongue pain.     ____________________PHYSICAL EXAM:  GENERAL:  NAD Alert and Oriented x 3   HEENT: mild smooth red patches on tongue, mild white film.   CARDIOVASCULAR:  S1, S2  LUNGS: CTAB  CHEST: L focal reproducible rib pain with areas of ecchymosis measuring ~ 2x2cm and 1x1cm well demarcated.    ABDOMEN:  soft, (-) tenderness, (-) distension, (+) bowel sounds, (-) guarding, (-) rebound (-) rigidity  EXTREMITIES:  no cyanosis / clubbing / edema.   NEURO: strength symmetric.   ____________________     VITALS:  Vital Signs Last 24 Hrs  T(C): 36.9 (26 Oct 2021 10:04), Max: 37.2 (25 Oct 2021 16:53)  T(F): 98.4 (26 Oct 2021 10:04), Max: 99 (25 Oct 2021 16:53)  HR: 88 (26 Oct 2021 10:04) (88 - 131)  BP: 122/74 (26 Oct 2021 10:04) (120/72 - 130/75)  BP(mean): --  RR: 16 (26 Oct 2021 10:04) (10 - 19)  SpO2: 98% (26 Oct 2021 10:04) (94% - 99%) Daily Height in cm: 157.48 (25 Oct 2021 16:53)    Daily   CAPILLARY BLOOD GLUCOSE        I&O's Summary      HISTORY:  PAST MEDICAL & SURGICAL HISTORY:  Substance abuse    Allergies    Allergy Status Unknown    Intolerances       LABS:                        10.8   11.98 )-----------( 255      ( 26 Oct 2021 04:17 )             33.6     10-25    141  |  109<H>  |  5<L>  ----------------------------<  92  4.3   |  26  |  0.67    Ca    9.1      25 Oct 2021 18:25    TPro  7.6  /  Alb  3.4  /  TBili  0.2  /  DBili  .07  /  AST  27  /  ALT  22  /  AlkPhos  69  10-25    PT/INR - ( 25 Oct 2021 18:25 )   PT: 11.7 sec;   INR: 1.01 ratio         PTT - ( 25 Oct 2021 18:25 )  PTT:30.0 sec  LIVER FUNCTIONS - ( 25 Oct 2021 18:25 )  Alb: 3.4 g/dL / Pro: 7.6 gm/dL / ALK PHOS: 69 U/L / ALT: 22 U/L / AST: 27 U/L / GGT: x           Urinalysis Basic - ( 25 Oct 2021 19:37 )    Color: Red / Appearance: bloody / S.005 / pH: x  Gluc: x / Ketone: Negative  / Bili: Negative / Urobili: Negative mg/dL   Blood: x / Protein: 100 mg/dL / Nitrite: Negative   Leuk Esterase: Small / RBC: 3-5 /HPF / WBC 0-2   Sq Epi: x / Non Sq Epi: Few / Bacteria: Moderate              MEDICATIONS:  MEDICATIONS  (STANDING):  gabapentin 800 milliGRAM(s) Oral two times a day  influenza   Vaccine 0.5 milliLiter(s) IntraMuscular once  lactated ringers. 1000 milliLiter(s) (75 mL/Hr) IV Continuous <Continuous>  QUEtiapine 100 milliGRAM(s) Oral <User Schedule>  QUEtiapine 300 milliGRAM(s) Oral <User Schedule>    MEDICATIONS  (PRN):  zolpidem 5 milliGRAM(s) Oral at bedtime PRN Insomnia  
                          Patient: KEYSHAWN RIVAS [id number] 31y Female                            Hospitalist Attending Note    Feels well  Tongue pain better. No difficulty swallowing.   Back pain better.    Wishing d/c home.     ____________________PHYSICAL EXAM:  GENERAL:  NAD Alert and Oriented x 3   HEENT: mild smooth red patches on tongue, mild white film.   CARDIOVASCULAR:  S1, S2  LUNGS: CTAB  CHEST: L focal reproducible rib pain with areas of ecchymosis measuring ~ 2x2cm and 1x1cm well demarcated.    ABDOMEN:  soft, (-) tenderness, (-) distension, (+) bowel sounds, (-) guarding, (-) rebound (-) rigidity  EXTREMITIES:  no cyanosis / clubbing / edema.   NEURO: strength symmetric.   ____________________    VITALS:  Vital Signs Last 24 Hrs  T(C): 36.7 (28 Oct 2021 10:38), Max: 36.7 (28 Oct 2021 10:38)  T(F): 98.1 (28 Oct 2021 10:38), Max: 98.1 (28 Oct 2021 10:38)  HR: 90 (28 Oct 2021 10:38) (77 - 90)  BP: 122/86 (28 Oct 2021 10:38) (113/76 - 132/84)  BP(mean): --  RR: 16 (28 Oct 2021 10:38) (16 - 18)  SpO2: 98% (28 Oct 2021 10:38) (98% - 99%) Daily     Daily Weight in k.8 (28 Oct 2021 05:26)  CAPILLARY BLOOD GLUCOSE        I&O's Summary      LABS:                        10.0   11.72 )-----------( 235      ( 27 Oct 2021 10:29 )             30.4     10-27    144  |  112<H>  |  12  ----------------------------<  115<H>  3.7   |  28  |  0.71    Ca    8.5      27 Oct 2021 10:29            CARDIAC MARKERS ( 28 Oct 2021 08:04 )  x     / x     / 38 U/L / x     / x              MEDICATIONS:  acetaminophen     Tablet .. 650 milliGRAM(s) Oral every 6 hours PRN  gabapentin 800 milliGRAM(s) Oral two times a day  influenza   Vaccine 0.5 milliLiter(s) IntraMuscular once  lactated ringers. 1000 milliLiter(s) IV Continuous <Continuous>  nystatin    Suspension 257322 Unit(s) Oral four times a day  QUEtiapine 100 milliGRAM(s) Oral <User Schedule>  QUEtiapine 300 milliGRAM(s) Oral <User Schedule>  zolpidem 5 milliGRAM(s) Oral at bedtime PRN    
                          Patient: KEYSHAWN RIVAS [id number] 31y Female                            Hospitalist Attending Note    c/o feeling tremulous.  Denies weakness / numbness.    ____________________PHYSICAL EXAM:  GENERAL:  NAD Alert and Oriented x 3   HEENT: NCAT  CARDIOVASCULAR:  S1, S2  LUNGS: CTAB  ABDOMEN:  soft, (-) tenderness, (-) distension, (+) bowel sounds, (-) guarding, (-) rebound (-) rigidity  EXTREMITIES:  no cyanosis / clubbing / edema.   NEURO: strength symmetric.   ____________________     VITALS:  Vital Signs Last 24 Hrs  T(C): 36.9 (26 Oct 2021 10:04), Max: 37.2 (25 Oct 2021 16:53)  T(F): 98.4 (26 Oct 2021 10:04), Max: 99 (25 Oct 2021 16:53)  HR: 88 (26 Oct 2021 10:04) (88 - 131)  BP: 122/74 (26 Oct 2021 10:04) (120/72 - 130/75)  BP(mean): --  RR: 16 (26 Oct 2021 10:04) (10 - 19)  SpO2: 98% (26 Oct 2021 10:04) (94% - 99%) Daily Height in cm: 157.48 (25 Oct 2021 16:53)    Daily   CAPILLARY BLOOD GLUCOSE        I&O's Summary      HISTORY:  PAST MEDICAL & SURGICAL HISTORY:  Substance abuse    Allergies    Allergy Status Unknown    Intolerances       LABS:                        10.8   11.98 )-----------( 255      ( 26 Oct 2021 04:17 )             33.6     10-25    141  |  109<H>  |  5<L>  ----------------------------<  92  4.3   |  26  |  0.67    Ca    9.1      25 Oct 2021 18:25    TPro  7.6  /  Alb  3.4  /  TBili  0.2  /  DBili  .07  /  AST  27  /  ALT  22  /  AlkPhos  69  10-25    PT/INR - ( 25 Oct 2021 18:25 )   PT: 11.7 sec;   INR: 1.01 ratio         PTT - ( 25 Oct 2021 18:25 )  PTT:30.0 sec  LIVER FUNCTIONS - ( 25 Oct 2021 18:25 )  Alb: 3.4 g/dL / Pro: 7.6 gm/dL / ALK PHOS: 69 U/L / ALT: 22 U/L / AST: 27 U/L / GGT: x           Urinalysis Basic - ( 25 Oct 2021 19:37 )    Color: Red / Appearance: bloody / S.005 / pH: x  Gluc: x / Ketone: Negative  / Bili: Negative / Urobili: Negative mg/dL   Blood: x / Protein: 100 mg/dL / Nitrite: Negative   Leuk Esterase: Small / RBC: 3-5 /HPF / WBC 0-2   Sq Epi: x / Non Sq Epi: Few / Bacteria: Moderate              MEDICATIONS:  MEDICATIONS  (STANDING):  gabapentin 800 milliGRAM(s) Oral two times a day  influenza   Vaccine 0.5 milliLiter(s) IntraMuscular once  lactated ringers. 1000 milliLiter(s) (75 mL/Hr) IV Continuous <Continuous>  QUEtiapine 100 milliGRAM(s) Oral <User Schedule>  QUEtiapine 300 milliGRAM(s) Oral <User Schedule>    MEDICATIONS  (PRN):  zolpidem 5 milliGRAM(s) Oral at bedtime PRN Insomnia

## 2021-10-28 NOTE — DISCHARGE NOTE PROVIDER - NSDCMRMEDTOKEN_GEN_ALL_CORE_FT
gabapentin 400 mg oral capsule: 2 cap(s) orally 2 times a day  nystatin 100,000 units/mL oral suspension: 5 milliliter(s) orally 4 times a day  QUEtiapine 100 mg oral tablet: 1 tab(s) orally once a day at 7am  QUEtiapine 300 mg oral tablet: 1 tab(s) orally once a day at 8pm

## 2021-10-28 NOTE — DISCHARGE NOTE NURSING/CASE MANAGEMENT/SOCIAL WORK - PATIENT PORTAL LINK FT
You can access the FollowMyHealth Patient Portal offered by [facility] by registering at the following website: [email or web address]. By joining [facility]’s FollowMyHealth portal, you will also be able to view your health information using other applications (apps) compatible with our system.

## 2021-10-28 NOTE — DISCHARGE NOTE PROVIDER - NSDCCPCAREPLAN_GEN_ALL_CORE_FT
PRINCIPAL DISCHARGE DIAGNOSIS  Diagnosis: Seizure  Assessment and Plan of Treatment:       SECONDARY DISCHARGE DIAGNOSES  Diagnosis: Intractable back pain  Assessment and Plan of Treatment:     Diagnosis: Heroin abuse  Assessment and Plan of Treatment:     Diagnosis: Oral thrush  Assessment and Plan of Treatment:

## 2021-10-28 NOTE — DISCHARGE NOTE PROVIDER - CARE PROVIDER_API CALL
Christoph Perry)  Clinical Neurophysiology; Neurology  [address]  [place] [address]  Phone: [phone]  Fax: [phone]  Follow Up Time:
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
## 2021-11-03 DIAGNOSIS — R07.81 PLEURODYNIA: ICD-10-CM

## 2021-11-03 DIAGNOSIS — E86.0 DEHYDRATION: ICD-10-CM

## 2021-11-03 DIAGNOSIS — R21 RASH AND OTHER NONSPECIFIC SKIN ERUPTION: ICD-10-CM

## 2021-11-03 DIAGNOSIS — F11.10 OPIOID ABUSE, UNCOMPLICATED: ICD-10-CM
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
## 2021-11-03 DIAGNOSIS — B37.0 CANDIDAL STOMATITIS: ICD-10-CM

## 2021-11-03 DIAGNOSIS — G40.509 EPILEPTIC SEIZURES RELATED TO EXTERNAL CAUSES, NOT INTRACTABLE, WITHOUT STATUS EPILEPTICUS: ICD-10-CM

## 2021-11-03 DIAGNOSIS — G89.11 ACUTE PAIN DUE TO TRAUMA: ICD-10-CM

## 2021-11-03 PROBLEM — F19.10 OTHER PSYCHOACTIVE SUBSTANCE ABUSE, UNCOMPLICATED: Chronic | Status: ACTIVE | Noted: 2021-10-26

## 2021-11-15 ENCOUNTER — APPOINTMENT (OUTPATIENT)
Dept: NEUROLOGY | Facility: CLINIC | Age: 31
End: 2021-11-15

## 2021-11-15 PROBLEM — Z00.00 ENCOUNTER FOR PREVENTIVE HEALTH EXAMINATION: Status: ACTIVE | Noted: 2021-11-15

## 2021-12-08 ENCOUNTER — APPOINTMENT (OUTPATIENT)
Dept: NEUROLOGY | Facility: CLINIC | Age: 31
End: 2021-12-08
Payer: MEDICAID

## 2021-12-08 VITALS
HEART RATE: 90 BPM | HEIGHT: 62 IN | SYSTOLIC BLOOD PRESSURE: 117 MMHG | BODY MASS INDEX: 36.99 KG/M2 | WEIGHT: 201 LBS | DIASTOLIC BLOOD PRESSURE: 76 MMHG

## 2021-12-08 DIAGNOSIS — G62.9 POLYNEUROPATHY, UNSPECIFIED: ICD-10-CM

## 2021-12-08 DIAGNOSIS — M25.50 PAIN IN UNSPECIFIED JOINT: ICD-10-CM

## 2021-12-08 DIAGNOSIS — K75.9 INFLAMMATORY LIVER DISEASE, UNSPECIFIED: ICD-10-CM

## 2021-12-08 DIAGNOSIS — M54.16 RADICULOPATHY, LUMBAR REGION: ICD-10-CM

## 2021-12-08 DIAGNOSIS — R56.9 UNSPECIFIED CONVULSIONS: ICD-10-CM

## 2021-12-08 PROCEDURE — 99213 OFFICE O/P EST LOW 20 MIN: CPT

## 2021-12-08 NOTE — PHYSICAL EXAM
[FreeTextEntry1] : Obese\par Psych talkative, easily distracted/tangential\par MS: Awake, alert, oriented x 4, language fluent, comprehension intact, naming\par intact, repetition intact, normal affect\par CN: PERRLA, EOMI, visual fields intact, facial sensation intact, face\par symmetric, hearing intact, no dysarthria, symmetric palatal elevation, tongue\par midline, symmetric shoulder shrug and SCM strength\par Motor: normal bulk, normal tone, power 5/5 in all four extremities, +postural\par hand tremors, no fasciculations\par Sensation: dysethesias to palmar areas\par Reflexes: 2 at biceps, brachioradialis, 2 patella, 1 ankle bilaterally. \par plantar response bilaterally. No clonus\par Coordination: no dysmetria\par \par joint arthralgias in the fingers, wrist\par

## 2021-12-08 NOTE — DISCUSSION/SUMMARY
[FreeTextEntry1] : History of epilepsy since age 16. \par Raises concern for IGE/GGE/PGE.  \par on GBP per prior MD, may not ideal if that is the case.\par Routine EEG/CT/Exam not revealing.\par substance abuse issues.\par ?myoclonus and tremor complaints\par joint pains, elevated CRP/ESR\par h/o Hep C, Acute liver failure\par \par L rib pain - s/p fall\par \par Rec f/u EMUx 48hrs\par Cont meds in EMU no change while being monitored.\par check HIV, B12, TSH, SPEP, Hep B/C for neuropathy on admission\par \par med changes pending based on that\par txt for substance use advised\par \par MRI brain, lumbar spine for  ?radiculopathy complaints\par EMG for ?bilat symm neuropathy vs radiculopathy\par Rheum referral for joint aches and +ESR/CRP\par Hepatology f/u\par \par RTC 4mo\par x time 41min

## 2021-12-08 NOTE — HISTORY OF PRESENT ILLNESS
[FreeTextEntry1] : \par HPI: 30 yo F admitted to [facility] 10/28/21 after losing consciousness on the street after taking intranasal heroin, reportedly. She remembers blacking out on the street and waking up with people around her. Ambulance was called and she was brought to ER. While in ER, patient felt very shaky and tremulous. \par \par She reports having a history of convulsive seizures since the age of 21? which occur sporadically, a\par few times per year, and for which she takes Gabapentin 800mg PO BID, although she says she does not see a neurologist regularly. \par \par Last event 4 yrs ago.  Total 3-4 all together.  \par C/o tremors hands, intention.  \par C/o joint pain, fingers, calf ache bilat symmetrical, pins and needles fingertips/feet.  \par LBP, inactive.\par \par No epilepsy risk factors.\par \par Head CT: normal\par \par Labs in ER 10/28/21:  ESR = 28, CRP = 32, WBC = 11.98, Lactate = 3.4, Utox - negative\par \par [facility], psychiatry following.

## 2021-12-29 ENCOUNTER — APPOINTMENT (OUTPATIENT)
Dept: MRI IMAGING | Facility: CLINIC | Age: 31
End: 2021-12-29

## 2022-03-28 ENCOUNTER — APPOINTMENT (OUTPATIENT)
Dept: NEUROLOGY | Facility: CLINIC | Age: 32
End: 2022-03-28

## 2022-04-08 ENCOUNTER — APPOINTMENT (OUTPATIENT)
Dept: NEUROLOGY | Facility: CLINIC | Age: 32
End: 2022-04-08

## 2023-07-06 NOTE — CONSULT NOTE ADULT - REASON FOR ADMISSION
The medication list included in this document is our record of what you are currently taking, including any changes that were made at today's visit. If you find any differences when compared to your medications at home, or have any questions that were not answered at your visit, please contact the office. We are committed to providing you with the best care possible. In order to help us achieve these goals please remember to bring all medications, herbal products, and over the counter supplements with you to each visit. If your provider has ordered testing for you, please be sure to follow up with our office if you have not received results within 7 days after the testing took place. *If you receive a survey after visiting one of our offices, please take time to share your experience concerning your physician office visit. These surveys are confidential and no health information about you is shared. We are eager to improve for you and we are counting on your feedback to help make that happen. Keep a list of your medicines with you. List all of the prescription medicines, nonprescription medicines, supplements, natural remedies, and vitamins that you take. Tell your healthcare providers who treat you about all of the products you are taking. Your provider can provide you with a form to keep track of them. Just ask. Follow the directions that come with your medicine, including information about food or alcohol. Make sure you know how and when to take your medicine. Do not take more or less than you are supposed to take. Keep all medicines out of the reach of children. Store medicines according to the directions on the label. Monitor yourself. Learn to know how your body reacts to your new medicine and keep track of how it makes you feel before attempting (If your provider has allowed you to do so) to drive or go to work.    Seek emergency medical attention if you think you have used too much of this Syncope eval.

## 2025-05-28 NOTE — ED PROVIDER NOTE - NS ED SCRIBE STATEMENT
[de-identified] : LSPINE Inspection: No rash or ecchymosis Palpation: No tenderness to palpation or spasm in bilateral thoracic and lumbar paraspinal musculature, no SI joint tenderness to palpation ROM: Full with no pain Strength: 5/5 bilateral hip flexors, knee extensors, ankle dorsiflexors, EHL, ankle plantarflexors Sensation: Sensation present to light touch bilateral L2-S1 distributions Provocative maneuvers: Positive R straight leg raise  Bilateral hips- Palpation: No tenderness to palpation over greater trochanter or IT band ROM: No pain with flexion and internal rotation [Facet arthropathy] : Facet arthropathy [Disc space narrowing] : Disc space narrowing [Scoliosis] : Scoliosis [Spondylolithesis] : Spondylolithesis [FreeTextEntry1] : 16  Attending